# Patient Record
Sex: MALE | Race: WHITE
[De-identification: names, ages, dates, MRNs, and addresses within clinical notes are randomized per-mention and may not be internally consistent; named-entity substitution may affect disease eponyms.]

---

## 2020-03-04 ENCOUNTER — HOSPITAL ENCOUNTER (OUTPATIENT)
Dept: HOSPITAL 47 - RADNMMAIN | Age: 75
Discharge: HOME | End: 2020-03-04
Attending: INTERNAL MEDICINE
Payer: MEDICARE

## 2020-03-04 DIAGNOSIS — R41.89: Primary | ICD-10-CM

## 2020-03-04 PROCEDURE — 78803 RP LOCLZJ TUM SPECT 1 AREA: CPT

## 2020-03-04 NOTE — NM
EXAMINATION TYPE: NM brain SPECT

 

DATE OF EXAM: 3/4/2020

 

COMPARISON: Prior nuclear medicine brain SPECT study November 18, 2015.

 

HISTORY: Signs and symptoms involving cognitive functions and awareness per order.

 

TECHNIQUE: Following injection of 23.3 mCi Tc 99m Neurolite, SPECT images of the brain were obtained 
and reconstructed in three axes.  Images obtained 45 minutes post injection.

 

FINDINGS:

No foci of significantly abnormal diminished metabolism. The remainder of the brain perfusion is norm
al without evidence of a classic dementia pattern. 

 

IMPRESSION: As above.